# Patient Record
(demographics unavailable — no encounter records)

---

## 2025-07-28 NOTE — HISTORY OF PRESENT ILLNESS
[LMP unknown] : LMP unknown [postmenopausal] : postmenopausal [N] : Patient is not sexually active [Y] : Positive pregnancy history [unknown] : Patient is unsure of the date of her LMP [Menarche Age: ____] : age at menarche was [unfilled] [Post-Menopause, No Sxs] : post-menopausal, currently without symptoms [Menopause Age: ____] : age at menopause was [unfilled] [No] : Patient does not have concerns regarding sex [Previously active] : previously active [TextBox_4] : 68 -year old  2 para 2 postmenopausal presents for an annual examination. Review of systems are negative.  [Mammogramdate] : 2022 [TextBox_31] : 3-4 years ago [TextBox_37] : pt is unsure [TextBox_43] : pt is unsure [PGHxTotal] : 2 [[facility]] : 2 [[facility] MaryvalexLiving] : 2

## 2025-07-28 NOTE — PLAN
[FreeTextEntry1] : Wellness exam. Normal physical examination. Recommendations: Mammography, bone density screening, ophthalmological, dental, and dermatological examinations.  Status post colonoscopy greater than 5 years ago.  Patient advised to contact the gastroenterologist for recommendations regarding timetable for repeat screening.  Discussed proper nutrition and physical exercise. Reviewed age-appropriate vaccinations.

## 2025-07-28 NOTE — PHYSICAL EXAM
[Chaperoned Physical Exam] : A chaperone was present in the examining room during all aspects of the physical examination. [MA] : MA [Oriented x3] : oriented x3 [Examination Of The Breasts] : a normal appearance [No Masses] : no breast masses were palpable [Labia Majora] : normal [Labia Minora] : normal [Normal] : normal [Uterine Adnexae] : normal [FreeTextEntry2] :  Appropriately responsive, alert, and no acute distress.  [FreeTextEntry3] :  Thyroid is non-enlarged, nontender. No palpable nodules or goiter. No lymphadenopathy.  [FreeTextEntry7] :  Soft. Nondistended. Nontender. No rebound or guarding. There are no palpable masses.  [Vulvar Atrophy] : vulvar atrophy [Atrophy] : atrophy [FreeTextEntry1] :  External genitalia are within normal limits with no lesions visualized.  [FreeTextEntry4] :  No vaginal discharge, blood or any lesions noted within the vaginal vault.  [FreeTextEntry5] :  A speculum was inserted without any difficulty. The cervix was normal in appearance. Pap smear obtained. No cervical motion tenderness.  [FreeTextEntry6] : Bimanual examination was notable for a normal, nontender uterus. There were no palpable adnexal masses or adnexal tenderness.  [FreeTextEntry9] :  No lesions. No palpable masses.